# Patient Record
Sex: FEMALE | Race: WHITE | NOT HISPANIC OR LATINO | Employment: OTHER | URBAN - METROPOLITAN AREA
[De-identification: names, ages, dates, MRNs, and addresses within clinical notes are randomized per-mention and may not be internally consistent; named-entity substitution may affect disease eponyms.]

---

## 2018-12-10 NOTE — PATIENT DISCUSSION
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 Neuro referal. Also, stat ESR to rule TA.

## 2019-07-25 ENCOUNTER — RETINA CONSULT (OUTPATIENT)
Dept: URBAN - METROPOLITAN AREA CLINIC 35 | Facility: CLINIC | Age: 74
End: 2019-07-25

## 2019-07-25 DIAGNOSIS — H35.373: ICD-10-CM

## 2019-07-25 DIAGNOSIS — H35.3131: ICD-10-CM

## 2019-07-25 DIAGNOSIS — H34.8312: ICD-10-CM

## 2019-07-25 DIAGNOSIS — H35.723: ICD-10-CM

## 2019-07-25 DIAGNOSIS — H35.033: ICD-10-CM

## 2019-07-25 PROCEDURE — 99204 OFFICE O/P NEW MOD 45 MIN: CPT

## 2019-07-25 PROCEDURE — 92250 FUNDUS PHOTOGRAPHY W/I&R: CPT

## 2019-07-25 PROCEDURE — 9222550 BILAT EXTENDED OPHTHALMOSCOPY, FIRST

## 2019-07-25 PROCEDURE — 92134 CPTRZ OPH DX IMG PST SGM RTA: CPT

## 2019-07-25 ASSESSMENT — VISUAL ACUITY
OD_SC: 20/40-2
OS_SC: 20/40-1

## 2019-07-25 ASSESSMENT — TONOMETRY
OS_IOP_MMHG: 14
OD_IOP_MMHG: 15

## 2024-03-04 ENCOUNTER — NEW PATIENT (OUTPATIENT)
Dept: URBAN - METROPOLITAN AREA CLINIC 38 | Facility: CLINIC | Age: 79
End: 2024-03-04

## 2024-03-04 DIAGNOSIS — H35.3131: ICD-10-CM

## 2024-03-04 DIAGNOSIS — H52.7: ICD-10-CM

## 2024-03-04 DIAGNOSIS — H35.033: ICD-10-CM

## 2024-03-04 DIAGNOSIS — H35.723: ICD-10-CM

## 2024-03-04 DIAGNOSIS — H25.813: ICD-10-CM

## 2024-03-04 DIAGNOSIS — H04.123: ICD-10-CM

## 2024-03-04 DIAGNOSIS — H34.8312: ICD-10-CM

## 2024-03-04 DIAGNOSIS — H35.373: ICD-10-CM

## 2024-03-04 PROCEDURE — 92004 COMPRE OPH EXAM NEW PT 1/>: CPT

## 2024-03-04 PROCEDURE — 92015 DETERMINE REFRACTIVE STATE: CPT

## 2024-03-04 ASSESSMENT — KERATOMETRY
OD_AXISANGLE2_DEGREES: 90
OD_K2POWER_DIOPTERS: 42
OS_AXISANGLE_DEGREES: 35
OS_K2POWER_DIOPTERS: 41.75
OD_K1POWER_DIOPTERS: 41.25
OS_K1POWER_DIOPTERS: 41.50
OD_AXISANGLE_DEGREES: 180
OS_AXISANGLE2_DEGREES: 125

## 2024-03-04 ASSESSMENT — VISUAL ACUITY
OS_SC: J10-
OS_CC: J1
OU_CC: 20/25-1
OD_SC: 20/50-1
OD_PH: 20/25
OS_SC: 20/50-1
OU_SC: J10-
OU_SC: 20/30-2
OS_PH: 20/40-1
OS_CC: 20/40-1
OD_SC: J12-
OU_CC: J1-
OD_CC: 20/50-1
OD_CC: J3

## 2024-03-04 ASSESSMENT — TONOMETRY
OD_IOP_MMHG: 21
OS_IOP_MMHG: 21

## 2024-05-13 ENCOUNTER — ESTABLISHED PATIENT (OUTPATIENT)
Dept: URBAN - METROPOLITAN AREA CLINIC 39 | Facility: CLINIC | Age: 79
End: 2024-05-13

## 2024-05-13 DIAGNOSIS — D23.112: ICD-10-CM

## 2024-05-13 PROCEDURE — 67840 REMOVE EYELID LESION: CPT | Mod: E4

## 2024-05-13 PROCEDURE — 92285 EXTERNAL OCULAR PHOTOGRAPHY: CPT

## 2024-05-13 RX ORDER — ERYTHROMYCIN 5 MG/G
OINTMENT OPHTHALMIC
End: 2024-05-27

## 2024-05-13 ASSESSMENT — KERATOMETRY
OD_K2POWER_DIOPTERS: 42
OS_K1POWER_DIOPTERS: 41.50
OS_K2POWER_DIOPTERS: 41.75
OD_AXISANGLE2_DEGREES: 90
OD_AXISANGLE_DEGREES: 180
OS_AXISANGLE2_DEGREES: 125
OS_AXISANGLE_DEGREES: 35
OD_K1POWER_DIOPTERS: 41.25

## 2024-05-13 ASSESSMENT — VISUAL ACUITY
OD_SC: 20/50
OS_SC: 20/40
OD_PH: 20/25

## 2024-05-30 ENCOUNTER — ESTABLISHED PATIENT (OUTPATIENT)
Dept: URBAN - METROPOLITAN AREA CLINIC 44 | Facility: CLINIC | Age: 79
End: 2024-05-30

## 2024-05-30 DIAGNOSIS — H02.834: ICD-10-CM

## 2024-05-30 DIAGNOSIS — H02.832: ICD-10-CM

## 2024-05-30 DIAGNOSIS — L98.8: ICD-10-CM

## 2024-05-30 DIAGNOSIS — Z98.890: ICD-10-CM

## 2024-05-30 DIAGNOSIS — H02.835: ICD-10-CM

## 2024-05-30 DIAGNOSIS — H02.831: ICD-10-CM

## 2024-05-30 PROCEDURE — 99499CC150 CONSULT, COSMETIC

## 2024-05-30 PROCEDURE — 64612C BOTOX / COSMETIC

## 2024-05-30 ASSESSMENT — KERATOMETRY
OD_AXISANGLE_DEGREES: 180
OD_AXISANGLE2_DEGREES: 90
OS_AXISANGLE_DEGREES: 35
OS_K2POWER_DIOPTERS: 41.75
OD_K2POWER_DIOPTERS: 42
OD_K1POWER_DIOPTERS: 41.25
OS_AXISANGLE2_DEGREES: 125
OS_K1POWER_DIOPTERS: 41.50